# Patient Record
Sex: FEMALE | Race: WHITE | Employment: OTHER | URBAN - METROPOLITAN AREA
[De-identification: names, ages, dates, MRNs, and addresses within clinical notes are randomized per-mention and may not be internally consistent; named-entity substitution may affect disease eponyms.]

---

## 2024-01-01 ENCOUNTER — HOSPITAL ENCOUNTER (INPATIENT)
Facility: HOSPITAL | Age: 64
LOS: 1 days | Discharge: HOSPICE | DRG: 308 | End: 2024-07-25
Attending: EMERGENCY MEDICINE | Admitting: HOSPITALIST
Payer: MEDICARE

## 2024-01-01 ENCOUNTER — APPOINTMENT (OUTPATIENT)
Facility: HOSPITAL | Age: 64
DRG: 308 | End: 2024-01-01
Payer: MEDICARE

## 2024-01-01 ENCOUNTER — HOSPITAL ENCOUNTER (INPATIENT)
Facility: HOSPITAL | Age: 64
LOS: 1 days | Discharge: HOSPICE | End: 2024-07-26
Attending: FAMILY MEDICINE | Admitting: FAMILY MEDICINE
Payer: COMMERCIAL

## 2024-01-01 ENCOUNTER — HOSPICE ADMISSION (OUTPATIENT)
Age: 64
End: 2024-01-01
Payer: MEDICARE

## 2024-01-01 VITALS
TEMPERATURE: 97.7 F | OXYGEN SATURATION: 86 % | SYSTOLIC BLOOD PRESSURE: 111 MMHG | RESPIRATION RATE: 16 BRPM | DIASTOLIC BLOOD PRESSURE: 65 MMHG | HEART RATE: 108 BPM

## 2024-01-01 VITALS
WEIGHT: 115 LBS | SYSTOLIC BLOOD PRESSURE: 109 MMHG | DIASTOLIC BLOOD PRESSURE: 71 MMHG | OXYGEN SATURATION: 93 % | BODY MASS INDEX: 18.48 KG/M2 | RESPIRATION RATE: 26 BRPM | TEMPERATURE: 99 F | HEART RATE: 93 BPM | HEIGHT: 66 IN

## 2024-01-01 DIAGNOSIS — R79.89 ELEVATED D-DIMER: ICD-10-CM

## 2024-01-01 DIAGNOSIS — R07.9 CHEST PAIN, UNSPECIFIED TYPE: ICD-10-CM

## 2024-01-01 DIAGNOSIS — R79.89 ELEVATED TROPONIN: ICD-10-CM

## 2024-01-01 DIAGNOSIS — R79.89 ELEVATED BRAIN NATRIURETIC PEPTIDE (BNP) LEVEL: ICD-10-CM

## 2024-01-01 DIAGNOSIS — I48.91 ATRIAL FIBRILLATION, UNSPECIFIED TYPE (HCC): ICD-10-CM

## 2024-01-01 DIAGNOSIS — E87.1 HYPONATREMIA: ICD-10-CM

## 2024-01-01 DIAGNOSIS — J18.9 HAP (HOSPITAL-ACQUIRED PNEUMONIA): ICD-10-CM

## 2024-01-01 DIAGNOSIS — C79.9 METASTATIC MALIGNANT NEOPLASM, UNSPECIFIED SITE (HCC): Primary | ICD-10-CM

## 2024-01-01 DIAGNOSIS — I48.91 ATRIAL FIBRILLATION WITH RVR (HCC): ICD-10-CM
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
## 2024-01-01 LAB
ABO + RH BLD: NORMAL
ALBUMIN SERPL-MCNC: 2.8 G/DL (ref 3.5–5)
ALBUMIN/GLOB SERPL: 0.8 (ref 1.1–2.2)
ALP SERPL-CCNC: 117 U/L (ref 45–117)
ALT SERPL-CCNC: 20 U/L (ref 12–78)
ANION GAP SERPL CALC-SCNC: 11 MMOL/L (ref 5–15)
ANION GAP SERPL CALC-SCNC: 7 MMOL/L (ref 5–15)
AST SERPL-CCNC: 41 U/L (ref 15–37)
BASOPHILS # BLD: 0 K/UL (ref 0–0.1)
BASOPHILS # BLD: 0 K/UL (ref 0–0.1)
BASOPHILS NFR BLD: 0 % (ref 0–1)
BASOPHILS NFR BLD: 0 % (ref 0–1)
BILIRUB SERPL-MCNC: 1.6 MG/DL (ref 0.2–1)
BLOOD GROUP ANTIBODIES SERPL: NORMAL
BUN SERPL-MCNC: 19 MG/DL (ref 6–20)
BUN SERPL-MCNC: 23 MG/DL (ref 6–20)
BUN/CREAT SERPL: 32 (ref 12–20)
BUN/CREAT SERPL: 32 (ref 12–20)
CALCIUM SERPL-MCNC: 8.2 MG/DL (ref 8.5–10.1)
CALCIUM SERPL-MCNC: 8.8 MG/DL (ref 8.5–10.1)
CHLORIDE SERPL-SCNC: 93 MMOL/L (ref 97–108)
CHLORIDE SERPL-SCNC: 93 MMOL/L (ref 97–108)
CO2 SERPL-SCNC: 22 MMOL/L (ref 21–32)
CO2 SERPL-SCNC: 28 MMOL/L (ref 21–32)
CREAT SERPL-MCNC: 0.6 MG/DL (ref 0.55–1.02)
CREAT SERPL-MCNC: 0.71 MG/DL (ref 0.55–1.02)
D DIMER PPP FEU-MCNC: 35.2 MG/L FEU (ref 0–0.65)
DIFFERENTIAL METHOD BLD: ABNORMAL
DIFFERENTIAL METHOD BLD: ABNORMAL
EKG ATRIAL RATE: 292 BPM
EKG DIAGNOSIS: NORMAL
EKG DIAGNOSIS: NORMAL
EKG Q-T INTERVAL: 310 MS
EKG Q-T INTERVAL: 322 MS
EKG QRS DURATION: 94 MS
EKG QRS DURATION: 96 MS
EKG QTC CALCULATION (BAZETT): 483 MS
EKG QTC CALCULATION (BAZETT): 523 MS
EKG R AXIS: 60 DEGREES
EKG R AXIS: 67 DEGREES
EKG T AXIS: -16 DEGREES
EKG T AXIS: -53 DEGREES
EKG VENTRICULAR RATE: 146 BPM
EKG VENTRICULAR RATE: 159 BPM
EOSINOPHIL # BLD: 0 K/UL (ref 0–0.4)
EOSINOPHIL # BLD: 0 K/UL (ref 0–0.4)
EOSINOPHIL NFR BLD: 0 % (ref 0–7)
EOSINOPHIL NFR BLD: 0 % (ref 0–7)
ERYTHROCYTE [DISTWIDTH] IN BLOOD BY AUTOMATED COUNT: 18.6 % (ref 11.5–14.5)
ERYTHROCYTE [DISTWIDTH] IN BLOOD BY AUTOMATED COUNT: 18.6 % (ref 11.5–14.5)
FLUAV RNA SPEC QL NAA+PROBE: NOT DETECTED
FLUBV RNA SPEC QL NAA+PROBE: NOT DETECTED
GLOBULIN SER CALC-MCNC: 3.7 G/DL (ref 2–4)
GLUCOSE SERPL-MCNC: 104 MG/DL (ref 65–100)
GLUCOSE SERPL-MCNC: 91 MG/DL (ref 65–100)
HCT VFR BLD AUTO: 22.4 % (ref 35–47)
HCT VFR BLD AUTO: 25.3 % (ref 35–47)
HGB BLD-MCNC: 7.3 G/DL (ref 11.5–16)
HGB BLD-MCNC: 8.2 G/DL (ref 11.5–16)
IMM GRANULOCYTES # BLD AUTO: 0.2 K/UL (ref 0–0.04)
IMM GRANULOCYTES # BLD AUTO: 0.2 K/UL (ref 0–0.04)
IMM GRANULOCYTES NFR BLD AUTO: 1 % (ref 0–0.5)
IMM GRANULOCYTES NFR BLD AUTO: 1 % (ref 0–0.5)
LYMPHOCYTES # BLD: 0.4 K/UL (ref 0.8–3.5)
LYMPHOCYTES # BLD: 0.6 K/UL (ref 0.8–3.5)
LYMPHOCYTES NFR BLD: 2 % (ref 12–49)
LYMPHOCYTES NFR BLD: 3 % (ref 12–49)
MAGNESIUM SERPL-MCNC: 1.6 MG/DL (ref 1.6–2.4)
MCH RBC QN AUTO: 28.1 PG (ref 26–34)
MCH RBC QN AUTO: 28.4 PG (ref 26–34)
MCHC RBC AUTO-ENTMCNC: 32.4 G/DL (ref 30–36.5)
MCHC RBC AUTO-ENTMCNC: 32.6 G/DL (ref 30–36.5)
MCV RBC AUTO: 86.6 FL (ref 80–99)
MCV RBC AUTO: 87.2 FL (ref 80–99)
MONOCYTES # BLD: 0.2 K/UL (ref 0–1)
MONOCYTES # BLD: 0.6 K/UL (ref 0–1)
MONOCYTES NFR BLD: 1 % (ref 5–13)
MONOCYTES NFR BLD: 3 % (ref 5–13)
NEUTS SEG # BLD: 17.2 K/UL (ref 1.8–8)
NEUTS SEG # BLD: 18.1 K/UL (ref 1.8–8)
NEUTS SEG NFR BLD: 93 % (ref 32–75)
NEUTS SEG NFR BLD: 96 % (ref 32–75)
NRBC # BLD: 0 K/UL (ref 0–0.01)
NRBC # BLD: 0 K/UL (ref 0–0.01)
NRBC BLD-RTO: 0 PER 100 WBC
NRBC BLD-RTO: 0 PER 100 WBC
NT PRO BNP: 7796 PG/ML
NT PRO BNP: 9738 PG/ML
PLATELET # BLD AUTO: 65 K/UL (ref 150–400)
PLATELET # BLD AUTO: 73 K/UL (ref 150–400)
PMV BLD AUTO: 10.6 FL (ref 8.9–12.9)
PMV BLD AUTO: 11 FL (ref 8.9–12.9)
POTASSIUM SERPL-SCNC: 3.4 MMOL/L (ref 3.5–5.1)
POTASSIUM SERPL-SCNC: 3.6 MMOL/L (ref 3.5–5.1)
PROT SERPL-MCNC: 6.5 G/DL (ref 6.4–8.2)
RBC # BLD AUTO: 2.57 M/UL (ref 3.8–5.2)
RBC # BLD AUTO: 2.92 M/UL (ref 3.8–5.2)
RBC MORPH BLD: ABNORMAL
RBC MORPH BLD: ABNORMAL
SARS-COV-2 RNA RESP QL NAA+PROBE: NOT DETECTED
SODIUM SERPL-SCNC: 126 MMOL/L (ref 136–145)
SODIUM SERPL-SCNC: 128 MMOL/L (ref 136–145)
SPECIMEN EXP DATE BLD: NORMAL
TROPONIN I SERPL HS-MCNC: 101 NG/L (ref 0–51)
TSH SERPL DL<=0.05 MIU/L-ACNC: 0.3 UIU/ML (ref 0.36–3.74)
WBC # BLD AUTO: 18.6 K/UL (ref 3.6–11)
WBC # BLD AUTO: 18.9 K/UL (ref 3.6–11)
WBC MORPH BLD: ABNORMAL

## 2024-01-01 PROCEDURE — 71045 X-RAY EXAM CHEST 1 VIEW: CPT

## 2024-01-01 PROCEDURE — 92610 EVALUATE SWALLOWING FUNCTION: CPT

## 2024-01-01 PROCEDURE — 83880 ASSAY OF NATRIURETIC PEPTIDE: CPT

## 2024-01-01 PROCEDURE — 87636 SARSCOV2 & INF A&B AMP PRB: CPT

## 2024-01-01 PROCEDURE — 6370000000 HC RX 637 (ALT 250 FOR IP): Performed by: HOSPITALIST

## 2024-01-01 PROCEDURE — 6360000002 HC RX W HCPCS: Performed by: INTERNAL MEDICINE

## 2024-01-01 PROCEDURE — 1100000000 HC RM PRIVATE

## 2024-01-01 PROCEDURE — 6360000002 HC RX W HCPCS: Performed by: NURSE PRACTITIONER

## 2024-01-01 PROCEDURE — 6360000002 HC RX W HCPCS: Performed by: FAMILY MEDICINE

## 2024-01-01 PROCEDURE — 2060000000 HC ICU INTERMEDIATE R&B

## 2024-01-01 PROCEDURE — 2580000003 HC RX 258: Performed by: HOSPITALIST

## 2024-01-01 PROCEDURE — 84443 ASSAY THYROID STIM HORMONE: CPT

## 2024-01-01 PROCEDURE — 36415 COLL VENOUS BLD VENIPUNCTURE: CPT

## 2024-01-01 PROCEDURE — 99285 EMERGENCY DEPT VISIT HI MDM: CPT

## 2024-01-01 PROCEDURE — 2500000003 HC RX 250 WO HCPCS: Performed by: EMERGENCY MEDICINE

## 2024-01-01 PROCEDURE — 80048 BASIC METABOLIC PNL TOTAL CA: CPT

## 2024-01-01 PROCEDURE — 96376 TX/PRO/DX INJ SAME DRUG ADON: CPT

## 2024-01-01 PROCEDURE — 80053 COMPREHEN METABOLIC PANEL: CPT

## 2024-01-01 PROCEDURE — 85025 COMPLETE CBC W/AUTO DIFF WBC: CPT

## 2024-01-01 PROCEDURE — 85379 FIBRIN DEGRADATION QUANT: CPT

## 2024-01-01 PROCEDURE — 93005 ELECTROCARDIOGRAM TRACING: CPT | Performed by: EMERGENCY MEDICINE

## 2024-01-01 PROCEDURE — 6360000002 HC RX W HCPCS: Performed by: HOSPITALIST

## 2024-01-01 PROCEDURE — 6360000002 HC RX W HCPCS: Performed by: EMERGENCY MEDICINE

## 2024-01-01 PROCEDURE — 94761 N-INVAS EAR/PLS OXIMETRY MLT: CPT

## 2024-01-01 PROCEDURE — 96365 THER/PROPH/DIAG IV INF INIT: CPT

## 2024-01-01 PROCEDURE — 96375 TX/PRO/DX INJ NEW DRUG ADDON: CPT

## 2024-01-01 PROCEDURE — 71275 CT ANGIOGRAPHY CHEST: CPT

## 2024-01-01 PROCEDURE — 86900 BLOOD TYPING SEROLOGIC ABO: CPT

## 2024-01-01 PROCEDURE — 96366 THER/PROPH/DIAG IV INF ADDON: CPT

## 2024-01-01 PROCEDURE — 2700000000 HC OXYGEN THERAPY PER DAY

## 2024-01-01 PROCEDURE — 86901 BLOOD TYPING SEROLOGIC RH(D): CPT

## 2024-01-01 PROCEDURE — 93005 ELECTROCARDIOGRAM TRACING: CPT | Performed by: HOSPITALIST

## 2024-01-01 PROCEDURE — 6360000004 HC RX CONTRAST MEDICATION: Performed by: EMERGENCY MEDICINE

## 2024-01-01 PROCEDURE — 2580000003 HC RX 258: Performed by: INTERNAL MEDICINE

## 2024-01-01 PROCEDURE — 83735 ASSAY OF MAGNESIUM: CPT

## 2024-01-01 PROCEDURE — 93010 ELECTROCARDIOGRAM REPORT: CPT | Performed by: SPECIALIST

## 2024-01-01 PROCEDURE — 2580000003 HC RX 258: Performed by: EMERGENCY MEDICINE

## 2024-01-01 PROCEDURE — 0656 HSPC GENERAL INPATIENT

## 2024-01-01 PROCEDURE — 86850 RBC ANTIBODY SCREEN: CPT

## 2024-01-01 PROCEDURE — 6370000000 HC RX 637 (ALT 250 FOR IP): Performed by: INTERNAL MEDICINE

## 2024-01-01 PROCEDURE — 84484 ASSAY OF TROPONIN QUANT: CPT

## 2024-01-01 PROCEDURE — 93010 ELECTROCARDIOGRAM REPORT: CPT | Performed by: INTERNAL MEDICINE

## 2024-01-01 RX ORDER — TRAZODONE HYDROCHLORIDE 50 MG/1
50 TABLET ORAL NIGHTLY
Status: DISCONTINUED | OUTPATIENT
Start: 2024-01-01 | End: 2024-01-01

## 2024-01-01 RX ORDER — DEXAMETHASONE 2 MG/1
2 TABLET ORAL 2 TIMES DAILY
COMMUNITY
Start: 2024-01-01

## 2024-01-01 RX ORDER — KETOROLAC TROMETHAMINE 30 MG/ML
30 INJECTION, SOLUTION INTRAMUSCULAR; INTRAVENOUS EVERY 6 HOURS PRN
Status: DISCONTINUED | OUTPATIENT
Start: 2024-01-01 | End: 2024-01-01 | Stop reason: HOSPADM

## 2024-01-01 RX ORDER — ALBUTEROL SULFATE 90 UG/1
2 AEROSOL, METERED RESPIRATORY (INHALATION) EVERY 4 HOURS PRN
Status: DISCONTINUED | OUTPATIENT
Start: 2024-01-01 | End: 2024-01-01

## 2024-01-01 RX ORDER — HYDROMORPHONE HYDROCHLORIDE 1 MG/ML
1 INJECTION, SOLUTION INTRAMUSCULAR; INTRAVENOUS; SUBCUTANEOUS
Status: COMPLETED | OUTPATIENT
Start: 2024-01-01 | End: 2024-01-01

## 2024-01-01 RX ORDER — LORAZEPAM 0.5 MG/1
0.5 TABLET ORAL 3 TIMES DAILY PRN
COMMUNITY
Start: 2020-09-08

## 2024-01-01 RX ORDER — DEXAMETHASONE 4 MG/1
2 TABLET ORAL 2 TIMES DAILY
Status: DISCONTINUED | OUTPATIENT
Start: 2024-01-01 | End: 2024-01-01

## 2024-01-01 RX ORDER — GLYCOPYRROLATE 0.2 MG/ML
0.2 INJECTION INTRAMUSCULAR; INTRAVENOUS EVERY 4 HOURS PRN
Status: DISCONTINUED | OUTPATIENT
Start: 2024-01-01 | End: 2024-01-01 | Stop reason: HOSPADM

## 2024-01-01 RX ORDER — MAGNESIUM SULFATE 1 G/100ML
1000 INJECTION INTRAVENOUS ONCE
Status: COMPLETED | OUTPATIENT
Start: 2024-01-01 | End: 2024-01-01

## 2024-01-01 RX ORDER — POTASSIUM CHLORIDE 7.45 MG/ML
10 INJECTION INTRAVENOUS
Status: DISCONTINUED | OUTPATIENT
Start: 2024-01-01 | End: 2024-01-01

## 2024-01-01 RX ORDER — MIRTAZAPINE 15 MG/1
7.5 TABLET, FILM COATED ORAL NIGHTLY
Status: DISCONTINUED | OUTPATIENT
Start: 2024-01-01 | End: 2024-01-01

## 2024-01-01 RX ORDER — POLYETHYLENE GLYCOL 3350 17 G/17G
17 POWDER, FOR SOLUTION ORAL DAILY PRN
Status: DISCONTINUED | OUTPATIENT
Start: 2024-01-01 | End: 2024-01-01

## 2024-01-01 RX ORDER — DILTIAZEM HYDROCHLORIDE 5 MG/ML
10 INJECTION INTRAVENOUS ONCE
Status: COMPLETED | OUTPATIENT
Start: 2024-01-01 | End: 2024-01-01

## 2024-01-01 RX ORDER — DOXYCYCLINE HYCLATE 100 MG
100 TABLET ORAL EVERY 12 HOURS SCHEDULED
Status: DISCONTINUED | OUTPATIENT
Start: 2024-01-01 | End: 2024-01-01

## 2024-01-01 RX ORDER — ONDANSETRON 2 MG/ML
4 INJECTION INTRAMUSCULAR; INTRAVENOUS EVERY 6 HOURS PRN
Status: DISCONTINUED | OUTPATIENT
Start: 2024-01-01 | End: 2024-01-01 | Stop reason: HOSPADM

## 2024-01-01 RX ORDER — CEFDINIR 300 MG/1
300 CAPSULE ORAL EVERY 12 HOURS SCHEDULED
Status: DISCONTINUED | OUTPATIENT
Start: 2024-01-01 | End: 2024-01-01

## 2024-01-01 RX ORDER — BISACODYL 10 MG
10 SUPPOSITORY, RECTAL RECTAL DAILY PRN
Status: DISCONTINUED | OUTPATIENT
Start: 2024-01-01 | End: 2024-01-01 | Stop reason: HOSPADM

## 2024-01-01 RX ORDER — LORAZEPAM 2 MG/ML
1 INJECTION INTRAMUSCULAR EVERY 4 HOURS
Status: DISCONTINUED | OUTPATIENT
Start: 2024-01-01 | End: 2024-01-01

## 2024-01-01 RX ORDER — SODIUM CHLORIDE 9 MG/ML
INJECTION, SOLUTION INTRAVENOUS PRN
Status: DISCONTINUED | OUTPATIENT
Start: 2024-01-01 | End: 2024-01-01

## 2024-01-01 RX ORDER — LORAZEPAM 2 MG/ML
1 CONCENTRATE ORAL
Status: DISCONTINUED | OUTPATIENT
Start: 2024-01-01 | End: 2024-01-01

## 2024-01-01 RX ORDER — MIRTAZAPINE 7.5 MG/1
7.5 TABLET, FILM COATED ORAL NIGHTLY
COMMUNITY
Start: 2024-01-01

## 2024-01-01 RX ORDER — METRONIDAZOLE 500 MG/100ML
500 INJECTION, SOLUTION INTRAVENOUS EVERY 8 HOURS
Status: DISCONTINUED | OUTPATIENT
Start: 2024-01-01 | End: 2024-01-01

## 2024-01-01 RX ORDER — MAGNESIUM SULFATE IN WATER 40 MG/ML
2000 INJECTION, SOLUTION INTRAVENOUS PRN
Status: DISCONTINUED | OUTPATIENT
Start: 2024-01-01 | End: 2024-01-01

## 2024-01-01 RX ORDER — MORPHINE SULFATE 2 MG/ML
2 INJECTION, SOLUTION INTRAMUSCULAR; INTRAVENOUS
Status: DISCONTINUED | OUTPATIENT
Start: 2024-01-01 | End: 2024-01-01 | Stop reason: HOSPADM

## 2024-01-01 RX ORDER — LORAZEPAM 2 MG/ML
1 INJECTION INTRAMUSCULAR
Status: DISCONTINUED | OUTPATIENT
Start: 2024-01-01 | End: 2024-01-01 | Stop reason: HOSPADM

## 2024-01-01 RX ORDER — MORPHINE SULFATE 2 MG/ML
2 INJECTION, SOLUTION INTRAMUSCULAR; INTRAVENOUS EVERY 4 HOURS
Status: DISCONTINUED | OUTPATIENT
Start: 2024-01-01 | End: 2024-01-01

## 2024-01-01 RX ORDER — LOPERAMIDE HYDROCHLORIDE 2 MG/1
2 CAPSULE ORAL PRN
Status: DISCONTINUED | OUTPATIENT
Start: 2024-01-01 | End: 2024-01-01 | Stop reason: HOSPADM

## 2024-01-01 RX ORDER — ACETAMINOPHEN 650 MG/1
650 SUPPOSITORY RECTAL EVERY 6 HOURS PRN
Status: DISCONTINUED | OUTPATIENT
Start: 2024-01-01 | End: 2024-01-01

## 2024-01-01 RX ORDER — ONDANSETRON 4 MG/1
4 TABLET, ORALLY DISINTEGRATING ORAL EVERY 8 HOURS PRN
Status: DISCONTINUED | OUTPATIENT
Start: 2024-01-01 | End: 2024-01-01

## 2024-01-01 RX ORDER — ONDANSETRON 2 MG/ML
4 INJECTION INTRAMUSCULAR; INTRAVENOUS EVERY 6 HOURS PRN
Status: DISCONTINUED | OUTPATIENT
Start: 2024-01-01 | End: 2024-01-01

## 2024-01-01 RX ORDER — LORAZEPAM 2 MG/ML
0.5 INJECTION INTRAMUSCULAR EVERY 4 HOURS PRN
Status: DISCONTINUED | OUTPATIENT
Start: 2024-01-01 | End: 2024-01-01

## 2024-01-01 RX ORDER — SODIUM CHLORIDE 0.9 % (FLUSH) 0.9 %
5-40 SYRINGE (ML) INJECTION EVERY 12 HOURS SCHEDULED
Status: DISCONTINUED | OUTPATIENT
Start: 2024-01-01 | End: 2024-01-01

## 2024-01-01 RX ORDER — LORAZEPAM 0.5 MG/1
0.5 TABLET ORAL EVERY 4 HOURS PRN
Status: DISCONTINUED | OUTPATIENT
Start: 2024-01-01 | End: 2024-01-01

## 2024-01-01 RX ORDER — LACTULOSE 10 G/15ML
10 SOLUTION ORAL 2 TIMES DAILY PRN
COMMUNITY
Start: 2024-01-01

## 2024-01-01 RX ORDER — LEVOTHYROXINE SODIUM 0.12 MG/1
125 TABLET ORAL DAILY
COMMUNITY
Start: 2024-01-01 | End: 2025-06-14

## 2024-01-01 RX ORDER — ACETAMINOPHEN 325 MG/1
650 TABLET ORAL EVERY 6 HOURS PRN
Status: DISCONTINUED | OUTPATIENT
Start: 2024-01-01 | End: 2024-01-01

## 2024-01-01 RX ORDER — ALBUTEROL SULFATE 90 UG/1
2 AEROSOL, METERED RESPIRATORY (INHALATION) EVERY 4 HOURS PRN
COMMUNITY
Start: 2024-01-01

## 2024-01-01 RX ORDER — LEVOFLOXACIN 5 MG/ML
750 INJECTION, SOLUTION INTRAVENOUS ONCE
Status: COMPLETED | OUTPATIENT
Start: 2024-01-01 | End: 2024-01-01

## 2024-01-01 RX ORDER — BUMETANIDE 0.25 MG/ML
0.5 INJECTION INTRAMUSCULAR; INTRAVENOUS 2 TIMES DAILY
Status: DISCONTINUED | OUTPATIENT
Start: 2024-01-01 | End: 2024-01-01

## 2024-01-01 RX ORDER — POTASSIUM CHLORIDE 750 MG/1
40 TABLET, FILM COATED, EXTENDED RELEASE ORAL PRN
Status: DISCONTINUED | OUTPATIENT
Start: 2024-01-01 | End: 2024-01-01

## 2024-01-01 RX ORDER — LACTULOSE 10 G/15ML
10 SOLUTION ORAL 2 TIMES DAILY PRN
Status: DISCONTINUED | OUTPATIENT
Start: 2024-01-01 | End: 2024-01-01

## 2024-01-01 RX ORDER — LEVOTHYROXINE SODIUM 0.12 MG/1
125 TABLET ORAL DAILY
Status: DISCONTINUED | OUTPATIENT
Start: 2024-01-01 | End: 2024-01-01

## 2024-01-01 RX ORDER — SODIUM CHLORIDE 0.9 % (FLUSH) 0.9 %
5-40 SYRINGE (ML) INJECTION PRN
Status: DISCONTINUED | OUTPATIENT
Start: 2024-01-01 | End: 2024-01-01 | Stop reason: HOSPADM

## 2024-01-01 RX ORDER — SODIUM CHLORIDE 0.9 % (FLUSH) 0.9 %
5-40 SYRINGE (ML) INJECTION PRN
Status: DISCONTINUED | OUTPATIENT
Start: 2024-01-01 | End: 2024-01-01

## 2024-01-01 RX ORDER — POTASSIUM CHLORIDE 7.45 MG/ML
10 INJECTION INTRAVENOUS PRN
Status: DISCONTINUED | OUTPATIENT
Start: 2024-01-01 | End: 2024-01-01

## 2024-01-01 RX ORDER — LORAZEPAM 0.5 MG/1
0.5 TABLET ORAL 3 TIMES DAILY PRN
Status: DISCONTINUED | OUTPATIENT
Start: 2024-01-01 | End: 2024-01-01

## 2024-01-01 RX ADMIN — METOPROLOL TARTRATE 12.5 MG: 25 TABLET, FILM COATED ORAL at 11:37

## 2024-01-01 RX ADMIN — LORAZEPAM 1 MG: 2 INJECTION INTRAMUSCULAR; INTRAVENOUS at 17:54

## 2024-01-01 RX ADMIN — METRONIDAZOLE 500 MG: 500 INJECTION, SOLUTION INTRAVENOUS at 09:33

## 2024-01-01 RX ADMIN — BUMETANIDE 0.5 MG: 0.25 INJECTION INTRAMUSCULAR; INTRAVENOUS at 09:29

## 2024-01-01 RX ADMIN — LORAZEPAM 1 MG: 2 INJECTION INTRAMUSCULAR; INTRAVENOUS at 05:51

## 2024-01-01 RX ADMIN — AMIODARONE HYDROCHLORIDE 1 MG/MIN: 50 INJECTION, SOLUTION INTRAVENOUS at 08:07

## 2024-01-01 RX ADMIN — AMIODARONE HYDROCHLORIDE 150 MG: 50 INJECTION, SOLUTION INTRAVENOUS at 07:49

## 2024-01-01 RX ADMIN — LORAZEPAM 1 MG: 2 INJECTION INTRAMUSCULAR; INTRAVENOUS at 08:38

## 2024-01-01 RX ADMIN — POTASSIUM BICARBONATE 20 MEQ: 782 TABLET, EFFERVESCENT ORAL at 11:37

## 2024-01-01 RX ADMIN — HYDROMORPHONE HYDROCHLORIDE 0.5 MG: 1 INJECTION, SOLUTION INTRAMUSCULAR; INTRAVENOUS; SUBCUTANEOUS at 16:39

## 2024-01-01 RX ADMIN — DILTIAZEM HYDROCHLORIDE 10 MG: 5 INJECTION, SOLUTION INTRAVENOUS at 11:38

## 2024-01-01 RX ADMIN — POTASSIUM CHLORIDE 10 MEQ: 7.46 INJECTION, SOLUTION INTRAVENOUS at 07:34

## 2024-01-01 RX ADMIN — MORPHINE SULFATE 2 MG: 2 INJECTION, SOLUTION INTRAMUSCULAR; INTRAVENOUS at 03:13

## 2024-01-01 RX ADMIN — MAGNESIUM SULFATE HEPTAHYDRATE 1000 MG: 1 INJECTION, SOLUTION INTRAVENOUS at 10:42

## 2024-01-01 RX ADMIN — DEXAMETHASONE 2 MG: 4 TABLET ORAL at 21:28

## 2024-01-01 RX ADMIN — HYDROMORPHONE HYDROCHLORIDE 1 MG: 1 INJECTION, SOLUTION INTRAMUSCULAR; INTRAVENOUS; SUBCUTANEOUS at 12:49

## 2024-01-01 RX ADMIN — LORAZEPAM 1 MG: 2 INJECTION INTRAMUSCULAR; INTRAVENOUS at 12:24

## 2024-01-01 RX ADMIN — MORPHINE SULFATE 2 MG: 2 INJECTION, SOLUTION INTRAMUSCULAR; INTRAVENOUS at 20:23

## 2024-01-01 RX ADMIN — HYDROMORPHONE HYDROCHLORIDE 0.5 MG: 1 INJECTION, SOLUTION INTRAMUSCULAR; INTRAVENOUS; SUBCUTANEOUS at 06:45

## 2024-01-01 RX ADMIN — APIXABAN 5 MG: 5 TABLET, FILM COATED ORAL at 21:28

## 2024-01-01 RX ADMIN — LORAZEPAM 0.5 MG: 2 INJECTION INTRAMUSCULAR; INTRAVENOUS at 03:47

## 2024-01-01 RX ADMIN — LORAZEPAM 1 MG: 2 INJECTION INTRAMUSCULAR; INTRAVENOUS at 03:14

## 2024-01-01 RX ADMIN — SODIUM CHLORIDE 5 MG/HR: 900 INJECTION, SOLUTION INTRAVENOUS at 11:46

## 2024-01-01 RX ADMIN — MORPHINE SULFATE 2 MG: 2 INJECTION, SOLUTION INTRAMUSCULAR; INTRAVENOUS at 14:46

## 2024-01-01 RX ADMIN — LORAZEPAM 0.5 MG: 0.5 TABLET ORAL at 16:39

## 2024-01-01 RX ADMIN — LORAZEPAM 1 MG: 2 INJECTION INTRAMUSCULAR; INTRAVENOUS at 00:22

## 2024-01-01 RX ADMIN — DOXYCYCLINE HYCLATE 100 MG: 100 TABLET, COATED ORAL at 11:37

## 2024-01-01 RX ADMIN — LORAZEPAM 1 MG: 2 INJECTION INTRAMUSCULAR; INTRAVENOUS at 09:38

## 2024-01-01 RX ADMIN — LORAZEPAM 1 MG: 2 INJECTION INTRAMUSCULAR; INTRAVENOUS at 20:22

## 2024-01-01 RX ADMIN — MAGNESIUM SULFATE HEPTAHYDRATE 1000 MG: 1 INJECTION, SOLUTION INTRAVENOUS at 06:25

## 2024-01-01 RX ADMIN — MORPHINE SULFATE 2 MG: 2 INJECTION, SOLUTION INTRAMUSCULAR; INTRAVENOUS at 05:51

## 2024-01-01 RX ADMIN — IOPAMIDOL 60 ML: 755 INJECTION, SOLUTION INTRAVENOUS at 13:36

## 2024-01-01 RX ADMIN — POTASSIUM CHLORIDE 10 MEQ: 7.46 INJECTION, SOLUTION INTRAVENOUS at 06:25

## 2024-01-01 RX ADMIN — LEVOFLOXACIN 750 MG: 5 INJECTION, SOLUTION INTRAVENOUS at 15:03

## 2024-01-01 RX ADMIN — MORPHINE SULFATE 2 MG: 2 INJECTION, SOLUTION INTRAMUSCULAR; INTRAVENOUS at 08:37

## 2024-01-01 RX ADMIN — MORPHINE SULFATE 2 MG: 2 INJECTION, SOLUTION INTRAMUSCULAR; INTRAVENOUS at 17:54

## 2024-01-01 RX ADMIN — SODIUM CHLORIDE, PRESERVATIVE FREE 10 ML: 5 INJECTION INTRAVENOUS at 07:52

## 2024-01-01 RX ADMIN — MORPHINE SULFATE 2 MG: 2 INJECTION, SOLUTION INTRAMUSCULAR; INTRAVENOUS at 00:22

## 2024-01-01 RX ADMIN — METOPROLOL TARTRATE 12.5 MG: 25 TABLET, FILM COATED ORAL at 21:29

## 2024-01-01 RX ADMIN — LORAZEPAM 1 MG: 2 INJECTION INTRAMUSCULAR; INTRAVENOUS at 14:41

## 2024-01-01 RX ADMIN — HYDROMORPHONE HYDROCHLORIDE 0.5 MG: 1 INJECTION, SOLUTION INTRAMUSCULAR; INTRAVENOUS; SUBCUTANEOUS at 23:47

## 2024-01-01 ASSESSMENT — PAIN SCALES - GENERAL
PAINLEVEL_OUTOF10: 4
PAINLEVEL_OUTOF10: 0
PAINLEVEL_OUTOF10: 9
PAINLEVEL_OUTOF10: 6
PAINLEVEL_OUTOF10: 4
PAINLEVEL_OUTOF10: 0
PAINLEVEL_OUTOF10: 4
PAINLEVEL_OUTOF10: 3
PAINLEVEL_OUTOF10: 2
PAINLEVEL_OUTOF10: 0
PAINLEVEL_OUTOF10: 7
PAINLEVEL_OUTOF10: 0
PAINLEVEL_OUTOF10: 9
PAINLEVEL_OUTOF10: 1
PAINLEVEL_OUTOF10: 0
PAINLEVEL_OUTOF10: 10
PAINLEVEL_OUTOF10: 8

## 2024-01-01 ASSESSMENT — PAIN DESCRIPTION - LOCATION
LOCATION: GENERALIZED
LOCATION: OTHER (COMMENT)
LOCATION: GENERALIZED
LOCATION: GENERALIZED
LOCATION: ABDOMEN

## 2024-01-01 ASSESSMENT — PAIN DESCRIPTION - DESCRIPTORS
DESCRIPTORS: PATIENT UNABLE TO DESCRIBE

## 2024-01-01 ASSESSMENT — LIFESTYLE VARIABLES
HOW MANY STANDARD DRINKS CONTAINING ALCOHOL DO YOU HAVE ON A TYPICAL DAY: PATIENT DOES NOT DRINK
HOW OFTEN DO YOU HAVE A DRINK CONTAINING ALCOHOL: NEVER

## 2024-01-01 ASSESSMENT — PAIN DESCRIPTION - ORIENTATION
ORIENTATION: MID
ORIENTATION: MID

## 2024-01-01 ASSESSMENT — PAIN - FUNCTIONAL ASSESSMENT: PAIN_FUNCTIONAL_ASSESSMENT: 0-10

## 2024-01-01 ASSESSMENT — ENCOUNTER SYMPTOMS: SHORTNESS OF BREATH: 1

## 2024-07-24 PROBLEM — C79.31 METASTATIC CANCER TO BRAIN (HCC): Status: ACTIVE | Noted: 2024-01-01

## 2024-07-24 NOTE — ED NOTES
upon entering room, rate increased on dilt drip from 5 to 7.5 mg/hr. Chandu pt's son at bedside. Pt is oriented to self, year, and president, doesn't know the month or why she's here.

## 2024-07-24 NOTE — H&P
at bedside.  He told me that she is DNR/DNI.  He is the power of .     Subjective:   CHIEF COMPLAINT: \"Chest pain\"    HISTORY OF PRESENT ILLNESS:     Antonette is a 64 y.o.   female with PMHx lung cancer, metastatic disease to the brain, CHF, A-fib comes to the hospital with chest pain.  Patient is very weak and history is mostly given by the son at bedside.  Patient was diagnosed with lung cancer with diffuse metastasis to the brain, adrenals and abdominal wall.  Patient is currently getting radiation to her abdomen.  Her last radiation treatment was yesterday.  Patient was taking Keytruda but it was stopped.  She is not eating and drinking enough.  She is also feeling short of breath.  When she arrived to the ER she was in A-fib with RVR.  She was started on Cardizem drip.  She is on Eliquis at home for A-fib.  When she arrived to the ER her blood pressure was 138/99, pulse 142, respiratory rate 20, temperature 98.6.  Blood work showed a sodium 126, potassium 3.6, hemoglobin 8.2, WBC 18.6, platelet count 73, creatinine 0.7.  CTA chest did not show any pulmonary embolism.  There is a left hilar mass suspicious for primary lung cancer with obstruction of the left main bronchus and complete collapse of the left upper lobe and hyperattenuation of the left lower lobe.    Available records were reviewed at the time of H&P.        No past medical history on file.   No past surgical history on file.  Social History     Tobacco Use    Smoking status: Not on file    Smokeless tobacco: Not on file   Substance Use Topics    Alcohol use: Not on file      No family history on file.     Allergies   Allergen Reactions    Demerol Hcl [Meperidine] Nausea And Vomiting    Naproxen Nausea And Vomiting        Prior to Admission medications    Not on File       REVIEW OF SYSTEMS:  See HPI for details  General: Positive for weakness  Eyes: negative for blurred vision, eye pain, loss of vision, diplopia  Ear Nose and  Throat: negative for rhinorrhea, pharyngitis, otalgia, tinnitus, speech or swallowing difficulties  Respiratory:  negative for pleuritic pain, cough, sputum production, wheezing, SOB, CREWS  Cardiology: Positive for chest pain  Gastrointestinal: negative for abdominal pain, N/V, dysphagia, change in bowel habits, bleeding  Genitourinary: negative for frequency, urgency, dysuria, hematuria, incontinence  Muskuloskeletal : negative for arthralgia, myalgia  Hematology: negative for easy bruising, bleeding, lymphadenopathy  Dermatological: negative for rash, ulceration, mole change, new lesion  Endocrine: negative for hot flashes or polydipsia  Neurological: negative for headache, dizziness, confusion, focal weakness, paresthesia, memory loss, gait disturbance  Psychological: negative for anxiety, depression, agitation      Objective:   VITALS:    Vitals:    07/24/24 1544   BP:    Pulse: 88   Resp:    Temp:    SpO2: 97%     PHYSICAL EXAM:    Physical Exam:    Gen: Well-developed, well-nourished, in no acute distress  HEENT:  Pink conjunctivae, PERRL, hearing intact to voice, moist mucous membranes  Neck: Supple, without masses, thyroid non-tender  Resp: No accessory muscle use, clear breath sounds without wheezes rales or rhonchi  Card: S1-S2 normal irregular rhythm, fast rate, edema positive  Abd:  Soft, non-tender, non-distended, normoactive bowel sounds are present, no palpable organomegaly and no detectable hernias  Lymph:  No cervical or inguinal adenopathy  Musc: No cyanosis or clubbing  Skin: No rashes or ulcers, skin turgor is good  Neuro:  Cranial nerves are grossly intact, no focal motor weakness, follows commands appropriately  Psych:  Good insight, oriented to person, place and time, alert          _______________________________________________________________________  Care Plan discussed with:    Comments   Patient X Discussed with patient in room. POC outlined and Questions answered    Family      RN x

## 2024-07-24 NOTE — ED TRIAGE NOTES
Patient to ER for complaints of chest pain that radiates to left arm.     Patient currently undergoing chem/radiation fro brain cancer that has metastasized. Last chemo treatment was yesterday.     Endorses SOB.     Hx of CHF, emphysema.     EKG being performed in triage.

## 2024-07-24 NOTE — ED NOTES
Pharmacy contacted regarding d/c of dilt drip; they stated to decrease under same protocol as it was increased and to continue to monitor hr

## 2024-07-24 NOTE — ED PROVIDER NOTES
[facility] EMERGENCY DEPT  EMERGENCY DEPARTMENT ENCOUNTER      Pt Name: Antonette Man  MRN: [id number]  Birthdate 1960  Date of evaluation: 7/24/2024  Provider: Taiwo Thurston DO    CHIEF COMPLAINT       Chief Complaint   Patient presents with    Chest Pain         HISTORY OF PRESENT ILLNESS   (Location/Symptom, Timing/Onset, Context/Setting, Quality, Duration, Modifying Factors, Severity)  Note limiting factors.   65 y/o F with history of metastatic brain CA on radiation therapy, CHF, pt is on Eliquis. She reports chest tightness and shortness of breath that began this morning at unknown time. She states tightness is in center of chest. No radiation of pain. No recent illness or fever. Pain is constant. She has chronic lower extremity edema, denies any calf pain or redness. No other concerns at this time.     The history is provided by the patient and the EMS personnel.         Review of External Medical Records:     Nursing Notes were reviewed.    REVIEW OF SYSTEMS    (2-9 systems for level 4, 10 or more for level 5)     Review of Systems   Constitutional:  Negative for chills and fever.   Respiratory:  Positive for shortness of breath.    Cardiovascular:  Positive for chest pain and leg swelling (chronic).   Neurological:  Negative for dizziness and syncope.       Except as noted above the remainder of the review of systems was reviewed and negative.       PAST MEDICAL HISTORY   No past medical history on file.      SURGICAL HISTORY     No past surgical history on file.      CURRENT MEDICATIONS       Previous Medications    No medications on file       ALLERGIES     Demerol hcl [meperidine] and Naproxen    FAMILY HISTORY     No family history on file.       SOCIAL HISTORY       Social History     Socioeconomic History    Marital status: Single           PHYSICAL EXAM    (up to 7 for level 4, 8 or more for level 5)     ED Triage Vitals   BP Temp Temp Source Pulse Respirations SpO2 Height Weight - Scale   07/24/24  1113 07/24/24 1113 07/24/24 1113 07/24/24 1113 07/24/24 1113 07/24/24 1113 07/24/24 1105 07/24/24 1105   (!) 138/99 98.6 °F (37 °C) Oral (!) 142 20 100 % 1.676 m (5' 6\") 52.2 kg (115 lb)       Body mass index is 18.56 kg/m².    Physical Exam  Vitals and nursing note reviewed.   HENT:      Mouth/Throat:      Mouth: Mucous membranes are dry.   Eyes:      Conjunctiva/sclera: Conjunctivae normal.   Cardiovascular:      Rate and Rhythm: Tachycardia present. Rhythm irregular.   Pulmonary:      Effort: Tachypnea present.      Breath sounds: Rhonchi (bilateral) present.   Abdominal:      Palpations: Abdomen is soft.      Comments: R sided periumbilical hernia, no tenderness.    Musculoskeletal:      Right lower leg: Edema present.      Left lower leg: Edema present.      Comments: No calf tenderness bilaterally   Skin:     General: Skin is warm.   Neurological:      General: No focal deficit present.      Mental Status: She is alert and oriented to person, place, and time.         DIAGNOSTIC RESULTS     EKG: All EKG's are interpreted by the Emergency Department Physician who either signs or Co-signs this chart in the absence of a cardiologist.        RADIOLOGY:   Non-plain film images such as CT, Ultrasound and MRI are read by the radiologist. Plain radiographic images are visualized and preliminarily interpreted by the emergency physician with the below findings:        Interpretation per the Radiologist below, if available at the time of this note:    CTA CHEST W WO CONTRAST   Final Result      1. Left hilar mass suspicious for primary lung cancer with obstruction of the   left mainstem bronchus and complete collapse of the left upper lobe and   hyperaeration of the left lower lobe.      2. Bilateral adrenal masses suspicious for metastatic disease.      3. Patchy airspace opacities in the right lung. Superimposed infection is not   excluded..      Electronically signed by Keith Mcdaniels      XR CHEST PORTABLE   Final

## 2024-07-25 PROBLEM — C78.00 METASTATIC LUNG CARCINOMA, UNSPECIFIED LATERALITY (HCC): Status: ACTIVE | Noted: 2024-01-01

## 2024-07-25 NOTE — PROGRESS NOTES
[facility]  Good Help to Those in Need  [phone]    Inpatient Nursing Admission   Patient Name: Antonette Man  YOB: 1960  Age: 64 y.o.       Date of Hospice Admission: 07/25/24  Hospice Attending Elected by Patient: Chente Ellison MD  Primary Care Physician: No primary care provider on file.  Admitting RN: Daria Garnica  : tbd     Level of Care (GIP/Routine/Respite): GIP  Facility of Care: [facility]  Patient Room: [facility]/     HOSPICE SUMMARY     Hospice Diagnosis: Metastatic lung carcinoma, unspecified laterality (HCC) [C78.00]  Onset Date of Hospice Diagnosis: 07/25/24  Summary of Disease Progression Leading to Hospice Diagnosis: Antonette is a 64 y.o.   female with PMHx lung cancer, metastatic disease to the brain, CHF, A-fib comes to the hospital with chest pain.  Patient is very weak and history is mostly given by the son at bedside.  Patient was diagnosed with lung cancer with diffuse metastasis to the brain, adrenals and abdominal wall.  Patient is currently getting radiation to her abdomen.  Her last radiation treatment was yesterday.  Patient was taking Keytruda but it was stopped.  She is not eating and drinking enough.  She is also feeling short of breath.  When she arrived to the ER she was in A-fib with RVR.  She was started on Cardizem drip.  She is on Eliquis at home for A-fib.  When she arrived to the ER her blood pressure was 138/99, pulse 142, respiratory rate 20, temperature 98.6.  Blood work showed a sodium 126, potassium 3.6, hemoglobin 8.2, WBC 18.6, platelet count 73, creatinine 0.7.  CTA chest did not show any pulmonary embolism.  There is a left hilar mass suspicious for primary lung cancer with obstruction of the left main bronchus and complete collapse of the left upper lobe and hyperattenuation of the left lower lobe.    Patient has continued to decline, lethargic, non-verbal signs of pain and dyspnea. Unable to tolerate PO medications

## 2024-07-25 NOTE — WOUND CARE
Wound Care aware of consult for \"wound to sacrum\", spoke with primary RN before assessing patient. Primary RN states patient has transitioned to comfort care with orders in place. Please re-consult wound care if needed.     Amira Dao RN  [facility] Inpatient Wound Care Department   Available via [employer]

## 2024-07-25 NOTE — PROGRESS NOTES
Speech LAnguage Pathology EVALUATION    Patient: Antonette Man (64 y.o. female)  Date: 7/25/2024  Primary Diagnosis: Hyponatremia [E87.1]  Metastatic cancer to brain (HCC) [C79.31]  Elevated troponin [R79.89]  Atrial fibrillation with RVR (HCC) [I48.91]  Elevated brain natriuretic peptide (BNP) level [R79.89]  HAP (hospital-acquired pneumonia) [J18.9, Y95]  Elevated d-dimer [R79.89]  Metastatic malignant neoplasm, unspecified site (HCC) [C79.9]  Chest pain, unspecified type [R07.9]       Precautions: aspiration                    ASSESSMENT :  RN asked SLP to eval swallow for meds, as some IV meds changed to oral .   Patient with coughing s/p most consistencies. Difficult to r/o aspiration vs issues with L upper lobe collapse.    She was able to take PO meds with moderate effort and cues.   Son arrived. MD paged to address Goals of care.   Admitted 7-14-24 with chest pain, a-fib RVR, anemia,   Chest CT:      1. Left hilar mass suspicious for primary lung cancer with obstruction of the  left mainstem bronchus and complete collapse of the left upper lobe and  hyperaeration of the left lower lobe.     2. Bilateral adrenal masses suspicious for metastatic disease.     3. Patchy airspace opacities in the right lung. Superimposed infection is not  excluded..  PMH: lung CA with mets to brain, abd. On Chemo and XRT  CHF         PLAN :  Recommendations and Planned Interventions:  Diet: diet and meds for comfort  Upright for PO if possible            SUBJECTIVE:   Patient stated, “mama.”  Patient's mother present.   OBJECTIVE:   No past medical history on file.No past surgical history on file.  Prior Level of Function/Home Situation:   Social/Functional History  Lives With: Son  Type of Home: House  Home Equipment: Hospital bed, Walker - Rolling (transport chair)  Receives Help From: Family  ADL Assistance: Needs assistance  Toileting: Needs assistance  Homemaking Assistance: Needs assistance  Ambulation Assistance: Needs

## 2024-07-25 NOTE — PLAN OF CARE
Problem: Discharge Planning  Goal: Discharge to home or other facility with appropriate resources  Outcome: /[facility] Progressing     Problem: Safety - Adult  Goal: Free from fall injury  Outcome: /[facility] Progressing     Problem: Skin/Tissue Integrity  Goal: Absence of new skin breakdown  Description: 1.  Monitor for areas of redness and/or skin breakdown  2.  Assess vascular access sites hourly  3.  Every 4-6 hours minimum:  Change oxygen saturation probe site  4.  Every 4-6 hours:  If on nasal continuous positive airway pressure, respiratory therapy assess nares and determine need for appliance change or resting period.  Outcome: HH/[facility] Progressing     Problem: Pain  Goal: Verbalizes/displays adequate comfort level or baseline comfort level  Outcome: /[facility] Progressing     Problem: Hospice Orientation  Goal: Demonstrate understanding of hospice philosophy, plan of care, and inpatient hospice program  Description: The patient/family/caregiver will demonstrate understanding of hospice philosophy, plan of care and the inpatient hospice program as evidenced by participation in meeting the patient's psychosocial, spiritual, medical, and physical needs inclusive of medical supplies/equipment focusing on symptoms.  Outcome: /[facility] Progressing     Problem: Potential for Alteration in Skin Integrity  Goal: Monitor skin for areas of alteration in skin integrity  Description: Patient [unfilled] will remain free from alterations of or worsening skin integrity as evidenced by no changes to skin during assessment each shift during the inpatient hospice stay.  [facility]     Problem: Risk for Falls  Goal: Fall prevention  Description: Patient  will remain free from falls as evidenced by no witnessed or reported falls each shift during the inpatient hospice stay.     Patient  and or family/caregiver will receive education on fall prevention as evidenced by verbalizing recall of using the call lights system, fall  family/caregiver will be provided education as needed by the interdisciplinary team as evidenced by verbalizing recall related to education in the dying process during admission and ongoing as needed during inpatient hospice stay.    Outcome: /[facility] Progressing     Problem: Depression  Goal: Verbalize and demonstrate ability to manage depression  Description: Patient  and or family/caregiver will demonstrate the use of techniques to manage depression as evidenced by decreased moodiness during the inpatient hospice stay.    Outcome: /[facility] Progressing     Problem: Breathing Pattern - Ineffective  Goal: Use of effective breathing techniques  Description: Patient  will indicate an effective breathing pattern as evidenced by the absence of respiratory distress each shift during the inpatient hospice stay.    Outcome: [facility]     Problem: Pressure Injury - Risk of  Goal: Prevention of pressure injury  Description: Patient  will remain free from acquired or worsening pressure injuries as evidenced by zero acquired pressure injuries or no changes to current pressure injury during skin assessment each shift during the inpatient hospice stay.    Patient  and or family/caregiver will verbalize recall of interventions and preventions of alteration in skin integrity during the admission process and ongoing as needed during the inpatient hospice stay.    Outcome: /[facility] Progressing     Problem: Grieving  Goal: Able to identify stages of grieving process  Description: Patient  and or family/caregiver will demonstrate their understanding of the stages of grief as evidenced by verbalization and acknowledgment of the various stages during the inpatient hospice stay.    Outcome: /[facility] Progressing     Problem: End of Life Process  Goal: Demonstrate understanding of end of life processes  Description: Patient  family/ caregiver will verbalize recall or return demonstration of emotional support and stress-reduction

## 2024-07-25 NOTE — PROGRESS NOTES
RRT reviewed chart for increased DI score related to pulse of 148. Per provider notes, pt is in known a fib. Pt is on cardiac monitoring. Amino order is in place for primary RN to start.    No need for RRT interventions at this time.

## 2024-07-25 NOTE — DISCHARGE SUMMARY
Please refer to recent progress/ACP note from today. Briefly pt with metastatic lung cancer admitted for a-fib with RVR, electrolyte disturbances and respiratory distress. Briefly on dilt gtt and amio gtt, IV antibiotics, IV diuresis and IV repletions. Decision made to pursue comfort measures while inpt and evaluated by hospice team for [facility] hospice. Pt discharged to [facility] hospice.

## 2024-07-25 NOTE — H&P
[facility]   Good Help to Those in Need  [phone]     Patient Name:  Antonette Man  YOB: 1960         Date of Provider Hospice Visit: 07/25/24     Level of Care:   [x] General Inpatient (GIP)              [] Routine                   [] Respite     Current Location of Care:  [] [facility]           [] [facility]         [] [facility]        [] [facility]           [] Hospice House ([facility])     IF [facility], patient referred from:  [] [facility]           [] [facility]         [] [facility]        [] [facility]           [] Home         [] Other:      Date of Original Hospice Admission:  7/25/2024  2:08 PM   Hospice Medical Director at time of admission: Chente Ayala MD     Principle Hospice Diagnosis:   Metastatic lung carcinoma, unspecified laterality (HCC) [C78.00]   Diagnoses RELATED to the terminal prognosis: encephalopathy, dyspnea, agitation    Other Diagnoses: COPD, anxiety disorder     HOSPICE SUMMARY   Antonette Man is a 64 year old woman with history of SCC head and neck diagnosed in 2017 s/p chemoradiation therapy for cure who was diagnosed with new primary non-small cell lung cancer in 2023 s/p lingulectomy, mediastinal LN dissection, and partial decortication on 8/21/23. Her disease progressed through subsequent lines of cancer-directed therapy with development of brain, adrenal gland, bone and abdominal wall metastases. She was last seen in [facility] on 7/15/2024 at which time it was determined that her poor functional status precluded additional systemic cancer therapies. She was in process of receiving 5 fractions of radiation therapy to her abdominal wall mass, with plan for home hospice referral upon completion. She presented to [facility] ED on 724/2024 with worsening shortness of breath, poor appetite and weakness. She was found to be in afib with RVR, was admitted and started on Cardizem drip.earlier today, a rapid response was called when patient was found by staff to be non-responsive to stimuli with lung crackles and

## 2024-07-25 NOTE — CARE COORDINATION
Case Management Discharge Note    Discharge Plan:  Patient is now GIP with [facility].  CM confirmed with the medical treatment team and [facility] Liaison, Daria Garnica RN.    ______________________________________  FAISAL Wolfe, RN-CM  [facility]- Care Management  Available via [employer]  7/25/2024  1:52 PM    
7/24/24  2:31 PM    CM noted consult for hospice services- attempted to meet with patients son but the provider was in the room. Will attempt to see later.     LAMAR Og  [facility]      Available via [employer]    
her care and her son and his spouse are assisting with her care. They have been in contact with [facility] and the plan was for her to finish radiation this week and onboard with [facility] on Saturday. CM called Ewelina Aguilar at [phone] to confirm. Ewelina let CM know this is the current plan. If the patient should go home before Saturday and decides to not finish radiation, they can onboard her sooner. They have a hospice order that is good for 30 days and do not need a new order. Ewelina would like CM to keep her updated on DC plans. If the patient decides to continue radiation for the remaining days that she will miss while being in the hospital, they can onboard when she finishes them. CM following for dc hospice plans.     DISCHARGE PLAN: Home with finishing radiation then hospice vs home with hospice    TRANSPORTATION AT DISCHARGE: EVELINA Abarca, MSW  [facility]      Available by [employer]

## 2024-07-25 NOTE — PROGRESS NOTES
MARCIAL HERNANDEZ [facility]  [address] [place] [address] [phone]        Hospitalist Progress Note      NAME: Antonette Man   :  1960  MRM:  [id number]    Date/Time: 2024  4:18 PM         Subjective:     Chief Complaint:  \"I don't know\"     Pt seen and examined. Overnight pt's HR elevated despite dilt gtt. Amio gtt started and pt converted to sinus. Pt's family at bedside; see ACP note     ROS:  (bold if positive, if negative)    Dyspnea   Cough   Sputum        Objective:       Vitals:          Last 24hrs VS reviewed since prior progress note. Most recent are:    Vitals:    24 1147   BP: 109/71   Pulse: 93   Resp:    Temp: 99 °F (37.2 °C)   SpO2: 93%     SpO2 Readings from Last 6 Encounters:   24 93%          Intake/Output Summary (Last 24 hours) at 2024 1618  Last data filed at 2024 1147  Gross per 24 hour   Intake 120 ml   Output 1250 ml   Net -1130 ml          Exam:     Physical Exam:    Gen: frail elderly female. Confused. Ill appearing   HEENT:  Pink conjunctivae, PERRL, hearing intact to voice, moist mucous membranes  Neck:  Supple, without masses, thyroid non-tender  Resp: coarse breath sounds   Card: tachycardic. Regular   Abd: ab mass. TTP. No reducible   Musc:  No cyanosis or clubbing  Skin:  No rashes or ulcers, skin turgor is good  Neuro:  Cranial nerves 3-12 are grossly intact   Psych: poor insight     Medications Reviewed: (see below)    Lab Data Reviewed: (see below)    ______________________________________________________________________    Medications:     No current facility-administered medications for this encounter.     No current outpatient medications on file.     Facility-Administered Medications Ordered in Other Encounters   Medication Dose Route Frequency    sodium chloride flush 0.9 % injection 5-40 mL  5-40 mL IntraVENous PRN    bisacodyl (DULCOLAX) suppository 10 mg  10 mg Rectal Daily PRN    morphine (PF) injection 2 mg   2 mg IntraVENous Q15 Min PRN    LORazepam (ATIVAN) injection 1 mg  1 mg IntraVENous Q15 Min PRN    ondansetron (ZOFRAN) injection 4 mg  4 mg IntraVENous Q6H PRN    glycopyrrolate (ROBINUL) injection 0.2 mg  0.2 mg IntraVENous Q4H PRN    LORazepam (ATIVAN) injection 1 mg  1 mg IntraVENous Q3H    morphine (PF) injection 2 mg  2 mg IntraVENous Q3H            Lab Review:     Recent Labs     07/24/24  1114 07/25/24  0203   WBC 18.6* 18.9*   HGB 8.2* 7.3*   HCT 25.3* 22.4*   PLT 73* 65*     Recent Labs     07/24/24  1114 07/25/24  0203   * 128*   K 3.6 3.4*   CL 93* 93*   CO2 22 28   BUN 23* 19   MG  --  1.6   ALT 20  --      No components found for: \"GLPOC\"         Assessment / Plan:   A-fib with RVR  -initially on dilt gtt and HR not controlled   -start amio gtt  -on eliquis   -replete K, Mg     Lung cancer with metastasis to the brain  -continue goals of care discussions as terminally ill with tenative plans for hospice in motion with [facility]      Pneumonia - concerns for aspiration   -on IV antibiotics; transition to PO as able      Acute on chronic diastolic heart failure - likely decompensated 2/2 a-fib with RVR  -continue diuresis      Anemia - likely 2/2 chemo, malignancy  -goals of care discussons     Total time spent with patient: 60 Minutes cc time. Pt critically ill on amio gtt; at risk for decompensation                  Care Plan discussed with: Patient, Family, Nursing Staff, Consultant/Specialist, and >50% of time spent in counseling and coordination of care, Nighttime NP, CM     Discussed:  Care Plan    Prophylaxis:  SCD's    Disposition:   TBD            ___________________________________________________    Attending Physician: Fior Zepeda MD

## 2024-07-25 NOTE — PROGRESS NOTES
0815: Amio bolus completed drip started and pt now in NSR at 89. Notified Dr. Zepeda- dilt running at 5mg and BP soft. Orders to stop dilt and continue amio drip.    1100: Notified Dr. Zepeda of difficulties getting in all IV meds due to overwhelming number of medications and 2 peripheral IV. She will adjust medications to oral as able. Orders to give oral metoprolol and wait 1 hour then can discontinue amiodarone.

## 2024-07-25 NOTE — PROGRESS NOTES
Spiritual Care Assessment/Progress Note  [facility]    Name: Antonette Man MRN: [id number]    Age: 64 y.o.     Sex: female   Language: English     Date: 7/25/2024            Total Time Calculated: 45 min              Spiritual Assessment begun in [facility] B3 INTERMEDIATE CARE UNIT  Service Provided For: Family  Referral/Consult From: Multi-disciplinary team  Encounter Overview/Reason: Grief, Loss, and Adjustments    Spiritual beliefs:      [x] Involved in a song tradition/spiritual practice: belief in God     [] Supported by a song community:      [] Claims no spiritual orientation:      [] Seeking spiritual identity:           [] Adheres to an individual form of spirituality:      [] Not able to assess:                Identified resources for coping and support system:   Support System: Spouse, Family members       [] Prayer                  [] Devotional reading               [] Music                  [] Guided Imagery     [] Pet visits                                        [] Other: (COMMENT)     Specific area/focus of visit   Encounter:    Crisis:    Spiritual/Emotional needs: Type: Spiritual Support, Difficult news received  Ritual, Rites and Sacraments:    Grief, Loss, and Adjustments: Type: Anticipatory Grief  Ethics/Mediation:    Behavioral Health:    Palliative Care:    Advance Care Planning:      Plan/Referrals: Other (Comment) (Please contact Spiritual Health for further consults.)    Narrative:   visit for the family of the patient on IMC[facility]. Reviewed pt's chart and spoke with pt's nurse. Pt appeared to be resting comfortably.  honored pt's need for rest. Pt's son, Chandu, his wife, and pt's mother were present. Family shared the pt's diagnosis, her treatments, remissions, and care she received at home. Family shared recent medical events and the decision for hospice care so that their loved one may transition in comfort and peace. Family shared stories and memories of the

## 2024-07-25 NOTE — PROGRESS NOTES
[facility]  Good Help to Those in Need  [phone]     Patient Name: Antonette Man  YOB: 1960  Age: 64 y.o.    [facility] Hospice RN Note:  Hospice consult received, reviewing chart. Will follow up with Unit Nurse and Care Manager to discuss plan of care, patient status and discharge disposition     Patient appropriate for IP LOC, will admit to [facility]  IP with the intention of transferring to [facility] when bed is available   Consents signed by son      Thank you for the opportunity to be of service to this patient.   Daria Garnica RN, BSN, PN  Clinical Nurse Liaison  [facility]  [phone] Mobile  [phone] Office   Available on Perfect Serve

## 2024-07-25 NOTE — ACP (ADVANCE CARE PLANNING)
Spoke with pt's son (and POA), son's wife and pt's mother. They are aware of her current clinical status. They conveyed that the original plan was for Antonette to finish radiation therapy this week and then transition to home hospice with [facility] on Saturday. They do not feel in her current clinical condition this would be feasible and certainly this seems to be the case. Pt has further deteriorated and c/o pain and agitation requiring IV meds. Seems to be consistently coughing with sips of water and high risk for aspiration with PO meds. Family all in agreement that they would like to transition to comfort measures at this time and await hospice team eval for possible GIP or hospice house. Questions answered. Discussed relayed to RN and hospice liaison. Confirmed DNR status. Additional cc time spent in discussions with family, patient (though not able to truly participate), RN, CM, hospice liaison 35 minutes cc

## 2024-07-25 NOTE — SIGNIFICANT EVENT
Pt seen at bedside, not responsive to stimuli, HR tachy, afib poor response to gtt at this time. Lungs with note crackles, tachypnea.   Discussed with nurse, relates pt with inc resp distress this am.     Discussed with MD this am, call placed to son to update on changes this am. Amnio added.

## 2024-07-26 PROBLEM — C34.92 LUNG CANCER, PRIMARY, WITH METASTASIS FROM LUNG TO OTHER SITE, LEFT (HCC): Status: ACTIVE | Noted: 2024-01-01

## 2024-07-26 PROBLEM — R06.02 SHORTNESS OF BREATH: Status: ACTIVE | Noted: 2024-01-01

## 2024-07-26 PROBLEM — R52 NONVERBAL SIGNS OF PAIN: Status: ACTIVE | Noted: 2024-01-01

## 2024-07-26 PROBLEM — Z51.5 HOSPICE CARE: Status: ACTIVE | Noted: 2024-01-01

## 2024-07-26 PROBLEM — R45.1 RESTLESSNESS AND AGITATION: Status: ACTIVE | Noted: 2024-01-01

## 2024-07-26 NOTE — PLAN OF CARE
Problem: Discharge Planning  Goal: Discharge to home or other facility with appropriate resources  7/26/2024 0045 by Ayaz Christiansen RN  Outcome: Progressing  7/25/2024 1352 by Daria Garnica RN  Outcome: /[facility] Progressing     Problem: Safety - Adult  Goal: Free from fall injury  7/26/2024 0045 by Ayaz Christiansen RN  Outcome: Progressing  7/25/2024 1352 by Daria Garnica RN  Outcome: /[facility]     Problem: Skin/Tissue Integrity  Goal: Absence of new skin breakdown  Description: 1.  Monitor for areas of redness and/or skin breakdown  2.  Assess vascular access sites hourly  3.  Every 4-6 hours minimum:  Change oxygen saturation probe site  4.  Every 4-6 hours:  If on nasal continuous positive airway pressure, respiratory therapy assess nares and determine need for appliance change or resting period.  7/26/2024 0045 by Ayaz Christiansen RN  Outcome: Progressing  7/25/2024 1352 by Daria Garnica RN  Outcome: [facility]     Problem: Pain  Goal: Verbalizes/displays adequate comfort level or baseline comfort level  7/26/2024 0045 by Ayaz Christiansen RN  Outcome: Progressing  7/25/2024 1352 by Daria Garnica RN  Outcome: [facility]     Problem: Hospice Orientation  Goal: Demonstrate understanding of hospice philosophy, plan of care, and inpatient hospice program  Description: The patient/family/caregiver will demonstrate understanding of hospice philosophy, plan of care and the inpatient hospice program as evidenced by participation in meeting the patient's psychosocial, spiritual, medical, and physical needs inclusive of medical supplies/equipment focusing on symptoms.  7/26/2024 0045 by Ayaz Christiansen RN  Outcome: Progressing  7/25/2024 1352 by Daria Garnica RN  Outcome: [facility]     Problem: Potential for Alteration in Skin Integrity  Goal: Monitor skin for areas of alteration in skin integrity  Description: Patient [unfilled] will remain free from alterations of or worsening skin

## 2024-07-26 NOTE — PROGRESS NOTES
son.   Family/caregiver (strength/weakness): Overwhelmed, has been mother's caregiver, estranged from brother.     Carver of care upon discharge (sigifredo all that apply):     []  No burden evident   []  Family must administer medications   []  Illness causing financial strain   [x]  Family/Support feels overwhelmed   []  Family/Support sleep disturbed with patient’s care   []  Patient’s care causes extra physical stress  of death  []  Illness causes changes in family lifestyle  []  Illness impacting family/support employment  []  Family experiencing increased time demands  []  Patient’s behavior endangers family  []  Denial of patient’s illness  []  Concern over outcome of illness/fear  []  Patient’s behavior embarrassing to family   Notes:      Risk Factors: (sigifredo all that apply):    [x]  No burden evident   []  Alcohol abuse  []  Financial resources inadequate to meet basic needs (food/house/etc)  []  Financial resources inadequate to meet health care needs (supplies/equipment/medications)  []  Food/nutrition resources inadequate  []  Home environment unsafe/inadequate for home care  []  Homicidal risk  []  Lives alone or without concerned relatives  []  Multiple medications/complex schedule  []  Physical limitations increase likelihood of falls  []  Plan of care/treatments complicated  []  Substance use/abuse  []  Suicidal risk  []  Visual impairment threatens safety/ability to perform self-care  []  Other (specify):     Abuse/Neglect (actual/potential risks):  [x]  No signs of abuse/neglect  []  History of abuse/neglect                 []  Physical          []  Sexual  []  History of domestic violence  []  Lacks adequate physical care  []  Lacks emotional nurturing/support  []  Lacks appropriate stimulation/cognitive experiences  []  Left alone inappropriately  []  Lacks necessary supervision  []  Inadequate or delayed medical care  []  Unsafe environment (i.e guns/drug use/history of violence in the home/etc.)  []  AWILDA  07/26/24    Time In:10: 00 am       Time Out:11:00 am

## 2024-07-26 NOTE — PROGRESS NOTES
[facility]  Good Help to Those in Need  [phone]     Patient Name: Antonette Man  YOB: 1960  Age: 64 y.o.    [facility] Hospice RN Note:  Patient currently admitted to [facility] under IP Hospice. Plan to transfer to [facility] for ongoing GIP LOC.    Transportation set for 10:15am  DDNR signed by son for travel    Bedside RN can call report to [phone], please leave IV and CASTAÑEDA intact for transfer     Thank you for the opportunity to be of service to this patient.   Daria Garnica, RN, BSN, [facility]  Clinical Nurse Liaison  [facility]  [phone] Mobile  [phone] Office   Available on Perfect Serve

## 2024-07-29 NOTE — DISCHARGE SUMMARY
Hospice Discharge Summary    [facility]  Good Help to Those in Need        Date of Admission: 7/25/2024  Date of Discharge: 7/26/2024    Antonette Man is a 64 y.o. year old who was admitted to [facility] at [facility] with a Hospice diagnosis of Metastatic lung carcinoma, unspecified laterality (HCC) [C78.00].      The patient's care was focused on comfort and the patient passed away on 7/26/2024.    Antonette Man is a 64 year old woman with history of SCC head and neck diagnosed in 2017 s/p chemoradiation therapy for cure who was diagnosed with new primary non-small cell lung cancer in 2023 s/p lingulectomy, mediastinal LN dissection, and partial decortication on 8/21/23. Her disease progressed through subsequent lines of cancer-directed therapy with development of brain, adrenal gland, bone and abdominal wall metastases. She was [facility] on 7/15/2024 at which time it was determined that her poor functional status precluded additional systemic cancer therapies. She was in process of receiving 5 fractions of radiation therapy to her abdominal wall mass, with plan for home hospice referral upon completion. She presented to [facility] ED on 724/2024 with worsening shortness of breath, poor appetite and weakness. She was found to be in afib with RVR, was admitted and started on Cardizem drip.earlier today, a rapid response was called when patient was found by staff to be non-responsive to stimuli with lung crackles and tachypnea. Given her poor prognosis, family elected to transition to full comfort-directed care with hospice support.     Functionally, the patient's Karnofsky and/or Palliative Performance Scale has declined over a period of weeks and is estimated at 10%. The patient is dependent on the following ADLs: all     Objective information that support this patients limited prognosis includes: See note above.       The patient/family chose comfort measures with the support of